# Patient Record
Sex: FEMALE
[De-identification: names, ages, dates, MRNs, and addresses within clinical notes are randomized per-mention and may not be internally consistent; named-entity substitution may affect disease eponyms.]

---

## 2020-07-21 PROBLEM — Z00.00 ENCOUNTER FOR PREVENTIVE HEALTH EXAMINATION: Status: ACTIVE | Noted: 2020-07-21

## 2020-07-24 ENCOUNTER — APPOINTMENT (OUTPATIENT)
Dept: CARDIOLOGY | Facility: CLINIC | Age: 65
End: 2020-07-24
Payer: MEDICAID

## 2020-07-24 VITALS
TEMPERATURE: 97.8 F | WEIGHT: 170 LBS | DIASTOLIC BLOOD PRESSURE: 90 MMHG | BODY MASS INDEX: 27.32 KG/M2 | HEART RATE: 87 BPM | SYSTOLIC BLOOD PRESSURE: 140 MMHG | OXYGEN SATURATION: 100 % | HEIGHT: 66 IN

## 2020-07-24 DIAGNOSIS — Z82.0 FAMILY HISTORY OF EPILEPSY AND OTHER DISEASES OF THE NERVOUS SYSTEM: ICD-10-CM

## 2020-07-24 DIAGNOSIS — B97.7 PAPILLOMAVIRUS AS THE CAUSE OF DISEASES CLASSIFIED ELSEWHERE: ICD-10-CM

## 2020-07-24 DIAGNOSIS — R53.1 WEAKNESS: ICD-10-CM

## 2020-07-24 DIAGNOSIS — M85.80 OTHER SPECIFIED DISORDERS OF BONE DENSITY AND STRUCTURE, UNSPECIFIED SITE: ICD-10-CM

## 2020-07-24 DIAGNOSIS — Z82.49 FAMILY HISTORY OF ISCHEMIC HEART DISEASE AND OTHER DISEASES OF THE CIRCULATORY SYSTEM: ICD-10-CM

## 2020-07-24 PROCEDURE — 99204 OFFICE O/P NEW MOD 45 MIN: CPT

## 2020-07-24 PROCEDURE — 93000 ELECTROCARDIOGRAM COMPLETE: CPT

## 2020-07-24 RX ORDER — LABETALOL HYDROCHLORIDE 200 MG/1
200 TABLET, FILM COATED ORAL EVERY 8 HOURS
Refills: 0 | Status: ACTIVE | COMMUNITY
Start: 2020-07-24

## 2020-07-24 RX ORDER — ATORVASTATIN CALCIUM 40 MG/1
40 TABLET, FILM COATED ORAL
Qty: 30 | Refills: 0 | Status: ACTIVE | COMMUNITY
Start: 2020-07-24

## 2020-07-24 RX ORDER — GABAPENTIN 100 MG/1
100 CAPSULE ORAL 3 TIMES DAILY
Refills: 0 | Status: ACTIVE | COMMUNITY
Start: 2020-07-24

## 2020-07-24 RX ORDER — METFORMIN HYDROCHLORIDE 500 MG/1
500 TABLET, COATED ORAL
Qty: 90 | Refills: 0 | Status: ACTIVE | COMMUNITY
Start: 2020-07-24

## 2020-07-24 NOTE — REASON FOR VISIT
[FreeTextEntry1] : Sherly is a 64-year-old female with complaint of feeling strange and dizzy on occasions, near fainting.  She has past medical history of hypertension, diabetes type 2, history of CVA, hyperlipidemia, osteopenia, unsteady gait walking with a cane.\par \par She states that her episodes of dizziness and feeling strange go back more than a year.  The approximate frequency is once a week.  She is a poor historian and does not provide accurate and consistent answers\par \par She had a loop recorder implanted on 6/25/2019 at [place].  The patient has been to [facility] with near fainting and as per the , her loop recordings were checked corresponding to the symptoms and were found to be unremarkable.\par \par He had an echocardiogram and carotid ultrasound in 2019 which were unremarkable as per Dr. Shine's notes.  Her EKG has sinus rhythm and is unremarkable on 7/21/2020\par \par She denies chest pain or shortness of breath.  She denies palpitations, PND, orthopnea.

## 2020-07-24 NOTE — ASSESSMENT
[FreeTextEntry1] : For further evaluation of her episodes of dizziness and feeling strange, I will interrogate her implanted loop recordings.  It seems that this has been followed by cardiologist at [place] in the past\par \par If no significant arrhythmias are noted, I doubt if there is cardiac etiology to her strange presentation.  It seems that her LV function and echocardiogram were unremarkable in 2019.  Her EKG is unremarkable.  She does not have hypotension or postural blood pressure drop.\par \par Hypoglycemic episodes have been ruled out in the past as per the .\par \par Addition of 5 mg of Norvasc for better blood pressure control is suggested.\par \par Evaluation of CAD with noninvasive testing is unlikely to provide etiology for her dizziness.\par \par She follows lipid profile with Dr. Shine.  Target LDL was discussed.  Patient is already on atorvastatin.\par \par Thank you for this referral and allowing me to participate in the care of this patient.  If can be of any further help or  if you have any questions, please do not hesitate to contact me\par \par \par Sincerely,\par \par Mayank Bernabe MD, FACC, SALMA

## 2020-07-24 NOTE — REVIEW OF SYSTEMS
[Feeling Fatigued] : feeling fatigued [see HPI] : see HPI [Negative] : Heme/Lymph [FreeTextEntry1] : Imbalance

## 2020-07-24 NOTE — PHYSICAL EXAM
[Normal Appearance] : normal appearance [General Appearance - Well Developed] : well developed [General Appearance - Well Nourished] : well nourished [Well Groomed] : well groomed [No Deformities] : no deformities [Eyelids - No Xanthelasma] : the eyelids demonstrated no xanthelasmas [General Appearance - In No Acute Distress] : no acute distress [Normal Conjunctiva] : the conjunctiva exhibited no abnormalities [No Oral Cyanosis] : no oral cyanosis [Normal Oral Mucosa] : normal oral mucosa [No Oral Pallor] : no oral pallor [Heart Sounds] : normal S1 and S2 [Heart Rate And Rhythm] : heart rate and rhythm were normal [Edema] : no peripheral edema present [Murmurs] : no murmurs present [Respiration, Rhythm And Depth] : normal respiratory rhythm and effort [Auscultation Breath Sounds / Voice Sounds] : lungs were clear to auscultation bilaterally [Exaggerated Use Of Accessory Muscles For Inspiration] : no accessory muscle use [Abdomen Soft] : soft [FreeTextEntry1] : Walks slowly with cane [Abdomen Tenderness] : non-tender [Nail Clubbing] : no clubbing of the fingernails [Skin Color & Pigmentation] : normal skin color and pigmentation [Cyanosis, Localized] : no localized cyanosis [] : no rash [Skin Turgor] : normal skin turgor [Affect] : the affect was normal [Memory Recent] : recent memory was not impaired [No Anxiety] : not feeling anxious

## 2020-07-28 ENCOUNTER — APPOINTMENT (OUTPATIENT)
Dept: CARDIOLOGY | Facility: CLINIC | Age: 65
End: 2020-07-28
Payer: MEDICAID

## 2020-07-28 VITALS
TEMPERATURE: 97.1 F | OXYGEN SATURATION: 98 % | BODY MASS INDEX: 27.32 KG/M2 | HEART RATE: 81 BPM | DIASTOLIC BLOOD PRESSURE: 90 MMHG | HEIGHT: 66 IN | WEIGHT: 170 LBS | SYSTOLIC BLOOD PRESSURE: 130 MMHG

## 2020-07-28 PROCEDURE — 93291 INTERROG DEV EVAL SCRMS IP: CPT

## 2020-07-28 NOTE — PROCEDURE
[de-identified] : [employer]  [de-identified] : REVEAL LINQ [de-identified] : [id number] [de-identified] : ILR interrogated in office today\par No new recorded events\par Followed remotely with [facility] - Dr. Barreto\par Battery good\par \par Sincerely,\par \par Tari Frost PA-C\par Patients history, testing and plan reviewed with supervising MD: Dr. Ashley Encinas

## 2020-09-04 ENCOUNTER — APPOINTMENT (OUTPATIENT)
Dept: CARDIOLOGY | Facility: CLINIC | Age: 65
End: 2020-09-04
Payer: MEDICAID

## 2020-09-04 PROCEDURE — 93298 REM INTERROG DEV EVAL SCRMS: CPT

## 2020-09-04 PROCEDURE — G2066: CPT

## 2020-09-04 NOTE — ASSESSMENT
[FreeTextEntry1] : Remote LNQ Summary\par \par 9-4-20\par Viewed lnq summary 7-29-20 to 9-4-20 (we took over remote monitoring from [facility] on 7-29-20)\par No events in this timeframe were reported.  \par While the patient was being monitored at [facility], two symptom episodes were reported in Nov 2019. The symptoms correlated to NSR with APC/PVC/abberant beat. \par Next summary on pa schedule 32d-cv

## 2020-10-07 ENCOUNTER — APPOINTMENT (OUTPATIENT)
Dept: CARDIOLOGY | Facility: CLINIC | Age: 65
End: 2020-10-07
Payer: MEDICAID

## 2020-10-07 PROCEDURE — 93298 REM INTERROG DEV EVAL SCRMS: CPT

## 2020-10-07 PROCEDURE — G2066: CPT

## 2020-10-07 NOTE — ASSESSMENT
[FreeTextEntry1] : Remote LNq Summary\par \par 10-7-20 \par Viewed lnq summary 9-3-20 to 10-7-20. \par No events were noted. \par Next summary on pa schedule 32d-cv

## 2020-10-29 ENCOUNTER — APPOINTMENT (OUTPATIENT)
Dept: CARDIOLOGY | Facility: CLINIC | Age: 65
End: 2020-10-29

## 2020-11-13 ENCOUNTER — APPOINTMENT (OUTPATIENT)
Dept: CARDIOLOGY | Facility: CLINIC | Age: 65
End: 2020-11-13
Payer: MEDICAID

## 2020-11-13 PROCEDURE — 93298 REM INTERROG DEV EVAL SCRMS: CPT

## 2020-11-13 PROCEDURE — G2066: CPT

## 2020-11-13 NOTE — ASSESSMENT
[FreeTextEntry1] : Remote LNq Summary\par \par 11-13-20 \par Viewed lnq summary 10-7-20 to 11-13-20. \par No events were noted.\par  Next summary on pa schedule 32d-cv

## 2020-12-15 ENCOUNTER — APPOINTMENT (OUTPATIENT)
Dept: CARDIOLOGY | Facility: CLINIC | Age: 65
End: 2020-12-15
Payer: MEDICAID

## 2020-12-15 PROCEDURE — G2066: CPT

## 2020-12-15 PROCEDURE — 93298 REM INTERROG DEV EVAL SCRMS: CPT

## 2020-12-15 NOTE — ASSESSMENT
[FreeTextEntry1] : Remote LNq Summary\par \par 12-15-20\par Viewed lnq summary 11-13-20 to 12-15-20.\par  No events were noted. \par Next summary on pa schedule 32d-cv

## 2021-01-26 ENCOUNTER — APPOINTMENT (OUTPATIENT)
Dept: CARDIOLOGY | Facility: CLINIC | Age: 66
End: 2021-01-26
Payer: MEDICAID

## 2021-01-26 PROCEDURE — 93298 REM INTERROG DEV EVAL SCRMS: CPT

## 2021-01-26 PROCEDURE — G2066: CPT

## 2021-01-26 NOTE — PROCEDURE
[de-identified] : \par 1-26-21 lnq summary from 12-16-20 to 1-22-21 No new events - next summary in 32 days - HARSHA

## 2021-02-26 ENCOUNTER — APPOINTMENT (OUTPATIENT)
Dept: CARDIOLOGY | Facility: CLINIC | Age: 66
End: 2021-02-26
Payer: MEDICAID

## 2021-02-26 ENCOUNTER — NON-APPOINTMENT (OUTPATIENT)
Age: 66
End: 2021-02-26

## 2021-02-26 PROCEDURE — G2066: CPT

## 2021-02-26 PROCEDURE — 93298 REM INTERROG DEV EVAL SCRMS: CPT

## 2021-04-01 ENCOUNTER — NON-APPOINTMENT (OUTPATIENT)
Age: 66
End: 2021-04-01

## 2021-04-01 ENCOUNTER — APPOINTMENT (OUTPATIENT)
Dept: CARDIOLOGY | Facility: CLINIC | Age: 66
End: 2021-04-01
Payer: MEDICAID

## 2021-04-01 PROCEDURE — 93298 REM INTERROG DEV EVAL SCRMS: CPT

## 2021-04-01 PROCEDURE — G2066: CPT

## 2021-05-06 ENCOUNTER — APPOINTMENT (OUTPATIENT)
Dept: CARDIOLOGY | Facility: CLINIC | Age: 66
End: 2021-05-06
Payer: MEDICAID

## 2021-05-06 ENCOUNTER — NON-APPOINTMENT (OUTPATIENT)
Age: 66
End: 2021-05-06

## 2021-05-06 PROCEDURE — G2066: CPT

## 2021-05-06 PROCEDURE — 93298 REM INTERROG DEV EVAL SCRMS: CPT

## 2021-06-10 ENCOUNTER — NON-APPOINTMENT (OUTPATIENT)
Age: 66
End: 2021-06-10

## 2021-06-10 ENCOUNTER — APPOINTMENT (OUTPATIENT)
Dept: CARDIOLOGY | Facility: CLINIC | Age: 66
End: 2021-06-10
Payer: MEDICAID

## 2021-06-10 PROCEDURE — 93298 REM INTERROG DEV EVAL SCRMS: CPT

## 2021-06-10 PROCEDURE — G2066: CPT

## 2021-07-15 ENCOUNTER — NON-APPOINTMENT (OUTPATIENT)
Age: 66
End: 2021-07-15

## 2021-07-15 ENCOUNTER — APPOINTMENT (OUTPATIENT)
Dept: CARDIOLOGY | Facility: CLINIC | Age: 66
End: 2021-07-15
Payer: MEDICAID

## 2021-07-15 PROCEDURE — 93298 REM INTERROG DEV EVAL SCRMS: CPT

## 2021-07-15 PROCEDURE — G2066: CPT

## 2021-08-19 ENCOUNTER — APPOINTMENT (OUTPATIENT)
Dept: CARDIOLOGY | Facility: CLINIC | Age: 66
End: 2021-08-19
Payer: MEDICAID

## 2021-08-19 ENCOUNTER — NON-APPOINTMENT (OUTPATIENT)
Age: 66
End: 2021-08-19

## 2021-08-19 PROCEDURE — G2066: CPT

## 2021-08-19 PROCEDURE — 93298 REM INTERROG DEV EVAL SCRMS: CPT

## 2021-09-23 ENCOUNTER — APPOINTMENT (OUTPATIENT)
Dept: CARDIOLOGY | Facility: CLINIC | Age: 66
End: 2021-09-23
Payer: MEDICAID

## 2021-09-23 ENCOUNTER — NON-APPOINTMENT (OUTPATIENT)
Age: 66
End: 2021-09-23

## 2021-09-23 PROCEDURE — 93298 REM INTERROG DEV EVAL SCRMS: CPT

## 2021-09-23 PROCEDURE — G2066: CPT

## 2021-10-28 ENCOUNTER — NON-APPOINTMENT (OUTPATIENT)
Age: 66
End: 2021-10-28

## 2021-10-28 ENCOUNTER — APPOINTMENT (OUTPATIENT)
Dept: CARDIOLOGY | Facility: CLINIC | Age: 66
End: 2021-10-28
Payer: MEDICAID

## 2021-10-28 PROCEDURE — G2066: CPT

## 2021-10-28 PROCEDURE — 93298 REM INTERROG DEV EVAL SCRMS: CPT

## 2021-12-14 ENCOUNTER — NON-APPOINTMENT (OUTPATIENT)
Age: 66
End: 2021-12-14

## 2021-12-15 ENCOUNTER — APPOINTMENT (OUTPATIENT)
Dept: CARDIOLOGY | Facility: CLINIC | Age: 66
End: 2021-12-15
Payer: MEDICAID

## 2021-12-15 ENCOUNTER — APPOINTMENT (OUTPATIENT)
Dept: CARDIOLOGY | Facility: CLINIC | Age: 66
End: 2021-12-15

## 2021-12-15 PROCEDURE — 93298 REM INTERROG DEV EVAL SCRMS: CPT

## 2021-12-15 PROCEDURE — G2066: CPT

## 2022-02-09 ENCOUNTER — APPOINTMENT (OUTPATIENT)
Dept: CARDIOLOGY | Facility: CLINIC | Age: 67
End: 2022-02-09

## 2022-03-16 ENCOUNTER — NON-APPOINTMENT (OUTPATIENT)
Age: 67
End: 2022-03-16

## 2022-03-16 ENCOUNTER — APPOINTMENT (OUTPATIENT)
Dept: CARDIOLOGY | Facility: CLINIC | Age: 67
End: 2022-03-16

## 2022-03-16 ENCOUNTER — APPOINTMENT (OUTPATIENT)
Dept: CARDIOLOGY | Facility: CLINIC | Age: 67
End: 2022-03-16
Payer: MEDICAID

## 2022-03-16 PROCEDURE — 93298 REM INTERROG DEV EVAL SCRMS: CPT

## 2022-03-16 PROCEDURE — G2066: CPT

## 2022-04-20 ENCOUNTER — APPOINTMENT (OUTPATIENT)
Dept: CARDIOLOGY | Facility: CLINIC | Age: 67
End: 2022-04-20

## 2023-01-12 ENCOUNTER — OFFICE (OUTPATIENT)
Dept: URBAN - METROPOLITAN AREA CLINIC 8 | Facility: CLINIC | Age: 68
Setting detail: OPHTHALMOLOGY
End: 2023-01-12
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
## 2023-01-12 PROCEDURE — NO SHOW FE NO SHOW FEE: Performed by: OPHTHALMOLOGY

## 2023-04-27 ENCOUNTER — OFFICE (OUTPATIENT)
Dept: URBAN - METROPOLITAN AREA CLINIC 8 | Facility: CLINIC | Age: 68
Setting detail: OPHTHALMOLOGY
End: 2023-04-27
Payer: MEDICAID

## 2023-04-27 DIAGNOSIS — H16.223: ICD-10-CM

## 2023-04-27 DIAGNOSIS — H52.4: ICD-10-CM

## 2023-04-27 DIAGNOSIS — H01.002: ICD-10-CM

## 2023-04-27 DIAGNOSIS — H25.13: ICD-10-CM

## 2023-04-27 DIAGNOSIS — H01.005: ICD-10-CM

## 2023-04-27 DIAGNOSIS — H40.013: ICD-10-CM

## 2023-04-27 DIAGNOSIS — H35.371: ICD-10-CM

## 2023-04-27 DIAGNOSIS — H01.004: ICD-10-CM

## 2023-04-27 DIAGNOSIS — E11.9: ICD-10-CM

## 2023-04-27 DIAGNOSIS — H10.45: ICD-10-CM

## 2023-04-27 DIAGNOSIS — H01.001: ICD-10-CM

## 2023-04-27 PROCEDURE — 92015 DETERMINE REFRACTIVE STATE: CPT | Performed by: OPHTHALMOLOGY

## 2023-04-27 PROCEDURE — 92250 FUNDUS PHOTOGRAPHY W/I&R: CPT | Performed by: OPHTHALMOLOGY

## 2023-04-27 PROCEDURE — 92014 COMPRE OPH EXAM EST PT 1/>: CPT | Performed by: OPHTHALMOLOGY

## 2023-04-27 ASSESSMENT — AXIALLENGTH_DERIVED
OS_AL: 22.9595
OD_AL: 22.9161

## 2023-04-27 ASSESSMENT — REFRACTION_CURRENTRX
OS_CYLINDER: -0.25
OS_SPHERE: +1.50
OS_ADD: +2.50
OD_VPRISM_DIRECTION: BF
OD_ADD: +2.75
OD_SPHERE: +2.25
OD_OVR_VA: 20/
OS_AXIS: 026
OS_VPRISM_DIRECTION: BF
OS_OVR_VA: 20/
OD_CYLINDER: SPH

## 2023-04-27 ASSESSMENT — REFRACTION_MANIFEST
OS_CYLINDER: SPH
OD_CYLINDER: SPHERE
OD_ADD: +2.75
OS_SPHERE: +4.00
OS_VA1: 20/25-2
OD_SPHERE: +2.50
OS_VA1: 20/25-2
OS_SPHERE: +1.75
OD_VA1: 20/25
OS_VA2: 20/20(J1+)
OD_SPHERE: +4.75
OS_ADD: +2.75
OD_CYLINDER: SPH
OU_VA: 20/25-
OS_CYLINDER: SPHERE
OD_VA2: 20/20(J1+)
OD_ADD: +2.50
OS_ADD: +2.50
OU_VA: 20/25-
OD_SPHERE: +2.50
OS_CYLINDER: SPH
OS_VA2: 20/20(J1+)
OD_VA1: 20/25-
OS_SPHERE: +1.75
OD_VA1: 20/25-
OD_CYLINDER: SPH
OD_VA2: 20/20(J1+)
OS_VA1: 20/20

## 2023-04-27 ASSESSMENT — KERATOMETRY
OS_K2POWER_DIOPTERS: 43.00
OS_AXISANGLE_DEGREES: 095
OS_K1POWER_DIOPTERS: 42.50
OD_K1POWER_DIOPTERS: 42.25
OD_K2POWER_DIOPTERS: 43.50
OD_AXISANGLE_DEGREES: 072
METHOD_AUTO_MANUAL: AUTO

## 2023-04-27 ASSESSMENT — REFRACTION_AUTOREFRACTION
OD_SPHERE: +2.75
OD_CYLINDER: -0.75
OS_SPHERE: +2.50
OD_AXIS: 148
OS_CYLINDER: -0.25
OS_AXIS: 093

## 2023-04-27 ASSESSMENT — VISUAL ACUITY
OS_BCVA: 20/30-
OD_BCVA: 20/30-2

## 2023-04-27 ASSESSMENT — LID EXAM ASSESSMENTS
OS_BLEPHARITIS: LLL LUL
OD_BLEPHARITIS: RLL RUL

## 2023-04-27 ASSESSMENT — CONFRONTATIONAL VISUAL FIELD TEST (CVF)
OS_FINDINGS: FULL
OD_FINDINGS: FULL

## 2023-04-27 ASSESSMENT — TONOMETRY
OD_IOP_MMHG: 20
OS_IOP_MMHG: 18

## 2023-04-27 ASSESSMENT — SPHEQUIV_DERIVED
OS_SPHEQUIV: 2.375
OD_SPHEQUIV: 2.375

## 2024-01-25 ENCOUNTER — OFFICE (OUTPATIENT)
Dept: URBAN - METROPOLITAN AREA CLINIC 8 | Facility: CLINIC | Age: 69
Setting detail: OPHTHALMOLOGY
End: 2024-01-25
Payer: MEDICARE

## 2024-01-25 DIAGNOSIS — H40.013: ICD-10-CM

## 2024-01-25 DIAGNOSIS — H35.371: ICD-10-CM

## 2024-01-25 PROCEDURE — 99213 OFFICE O/P EST LOW 20 MIN: CPT | Performed by: OPHTHALMOLOGY

## 2024-01-25 PROCEDURE — 92133 CPTRZD OPH DX IMG PST SGM ON: CPT | Performed by: OPHTHALMOLOGY

## 2024-01-25 PROCEDURE — 76514 ECHO EXAM OF EYE THICKNESS: CPT | Performed by: OPHTHALMOLOGY

## 2024-01-25 ASSESSMENT — REFRACTION_MANIFEST
OU_VA: 20/25-
OS_VA1: 20/25-2
OU_VA: 20/25-
OD_CYLINDER: SPH
OD_VA1: 20/25-
OS_SPHERE: +4.00
OD_VA2: 20/20(J1+)
OD_VA2: 20/20(J1+)
OD_SPHERE: +2.50
OS_CYLINDER: SPH
OD_VA1: 20/25
OD_SPHERE: +2.50
OD_ADD: +2.50
OD_SPHERE: +4.75
OS_CYLINDER: SPHERE
OD_ADD: +2.75
OD_VA1: 20/25-
OS_VA1: 20/20
OS_VA1: 20/25-2
OS_CYLINDER: SPH
OS_ADD: +2.75
OS_SPHERE: +1.75
OS_SPHERE: +1.75
OS_VA2: 20/20(J1+)
OD_CYLINDER: SPH
OS_VA2: 20/20(J1+)
OD_CYLINDER: SPHERE
OS_ADD: +2.50

## 2024-01-25 ASSESSMENT — REFRACTION_CURRENTRX
OS_OVR_VA: 20/
OD_OVR_VA: 20/
OD_VPRISM_DIRECTION: BF
OS_CYLINDER: -0.25
OD_SPHERE: +2.25
OS_SPHERE: +1.50
OD_CYLINDER: SPH
OS_AXIS: 026
OD_ADD: +2.75
OS_ADD: +2.50
OS_VPRISM_DIRECTION: BF

## 2024-01-25 ASSESSMENT — CONFRONTATIONAL VISUAL FIELD TEST (CVF)
OD_FINDINGS: FULL
OS_FINDINGS: FULL

## 2024-01-25 ASSESSMENT — LID EXAM ASSESSMENTS
OS_BLEPHARITIS: LLL LUL
OD_BLEPHARITIS: RLL RUL

## 2024-01-25 ASSESSMENT — REFRACTION_AUTOREFRACTION
OD_CYLINDER: -0.75
OS_AXIS: 093
OS_CYLINDER: -0.25
OD_AXIS: 148
OS_SPHERE: +2.50
OD_SPHERE: +2.75

## 2024-01-25 ASSESSMENT — SPHEQUIV_DERIVED
OS_SPHEQUIV: 2.375
OD_SPHEQUIV: 2.375

## 2024-05-03 ENCOUNTER — OFFICE (OUTPATIENT)
Dept: URBAN - METROPOLITAN AREA CLINIC 8 | Facility: CLINIC | Age: 69
Setting detail: OPHTHALMOLOGY
End: 2024-05-03
Payer: MEDICARE

## 2024-05-03 DIAGNOSIS — H25.13: ICD-10-CM

## 2024-05-03 DIAGNOSIS — H01.004: ICD-10-CM

## 2024-05-03 DIAGNOSIS — H16.223: ICD-10-CM

## 2024-05-03 DIAGNOSIS — H40.013: ICD-10-CM

## 2024-05-03 DIAGNOSIS — H01.001: ICD-10-CM

## 2024-05-03 DIAGNOSIS — I63.50: ICD-10-CM

## 2024-05-03 PROCEDURE — 99213 OFFICE O/P EST LOW 20 MIN: CPT | Performed by: OPHTHALMOLOGY

## 2024-05-03 ASSESSMENT — LID EXAM ASSESSMENTS
OS_BLEPHARITIS: LLL LUL
OD_BLEPHARITIS: RLL RUL

## 2024-05-03 ASSESSMENT — CONFRONTATIONAL VISUAL FIELD TEST (CVF)
OD_FINDINGS: FULL
OS_FINDINGS: FULL

## 2024-06-19 PROBLEM — E11.9 TYPE 2 DIABETES MELLITUS: Status: ACTIVE | Noted: 2020-07-24

## 2024-06-19 PROBLEM — I10 HTN (HYPERTENSION): Status: ACTIVE | Noted: 2020-07-24

## 2024-06-19 PROBLEM — Z98.890 HISTORY OF LOOP RECORDER: Status: ACTIVE | Noted: 2020-09-04

## 2024-06-19 PROBLEM — E78.5 HLD (HYPERLIPIDEMIA): Status: ACTIVE | Noted: 2020-07-24

## 2024-06-19 PROBLEM — I63.9 CVA (CEREBRAL VASCULAR ACCIDENT): Status: ACTIVE | Noted: 2020-07-24

## 2024-06-19 PROBLEM — R42 DIZZINESS: Status: ACTIVE | Noted: 2020-07-24

## 2024-06-20 ENCOUNTER — NON-APPOINTMENT (OUTPATIENT)
Age: 69
End: 2024-06-20

## 2024-06-20 ENCOUNTER — APPOINTMENT (OUTPATIENT)
Dept: CARDIOLOGY | Facility: CLINIC | Age: 69
End: 2024-06-20
Payer: MEDICARE

## 2024-06-20 VITALS — DIASTOLIC BLOOD PRESSURE: 84 MMHG | SYSTOLIC BLOOD PRESSURE: 140 MMHG

## 2024-06-20 VITALS
DIASTOLIC BLOOD PRESSURE: 88 MMHG | SYSTOLIC BLOOD PRESSURE: 160 MMHG | BODY MASS INDEX: 28.28 KG/M2 | OXYGEN SATURATION: 98 % | WEIGHT: 176 LBS | HEIGHT: 66 IN | HEART RATE: 66 BPM

## 2024-06-20 DIAGNOSIS — E78.5 HYPERLIPIDEMIA, UNSPECIFIED: ICD-10-CM

## 2024-06-20 DIAGNOSIS — E11.9 TYPE 2 DIABETES MELLITUS W/OUT COMPLICATIONS: ICD-10-CM

## 2024-06-20 DIAGNOSIS — R42 DIZZINESS AND GIDDINESS: ICD-10-CM

## 2024-06-20 DIAGNOSIS — I63.9 CEREBRAL INFARCTION, UNSPECIFIED: ICD-10-CM

## 2024-06-20 DIAGNOSIS — R80.9 PROTEINURIA, UNSPECIFIED: ICD-10-CM

## 2024-06-20 DIAGNOSIS — I10 ESSENTIAL (PRIMARY) HYPERTENSION: ICD-10-CM

## 2024-06-20 DIAGNOSIS — Z98.890 OTHER SPECIFIED POSTPROCEDURAL STATES: ICD-10-CM

## 2024-06-20 PROCEDURE — 93000 ELECTROCARDIOGRAM COMPLETE: CPT

## 2024-06-20 PROCEDURE — 99203 OFFICE O/P NEW LOW 30 MIN: CPT

## 2024-06-20 RX ORDER — LOSARTAN POTASSIUM 25 MG/1
25 TABLET, FILM COATED ORAL TWICE DAILY
Qty: 180 | Refills: 3 | Status: ACTIVE | COMMUNITY
Start: 2024-06-20 | End: 1900-01-01

## 2024-06-20 NOTE — PROCEDURE
[de-identified] : \par  1-26-21 lnq summary from 12-16-20 to 1-22-21 No new events - next summary in 32 days - HARSHA

## 2024-06-20 NOTE — ASSESSMENT
[FreeTextEntry1] : 68-year-old female with past medical history of hypertension, diabetes type 2, history of CVA, hyperlipidemia, osteopenia, unsteady gait walking with a cane. Implanted loop recordings without significant arrhythmia.making it unlikely to be a cardiac etiology to her strange presentation. Hypoglycemic episodes have been ruled out in the past. BP today is elevated.

## 2024-06-20 NOTE — DISCUSSION/SUMMARY
[EKG obtained to assist in diagnosis and management of assessed problem(s)] : EKG obtained to assist in diagnosis and management of assessed problem(s) [FreeTextEntry1] : 1. We reviewed the importance of a healthy lifestyle including normal body weight, regular physical activity with 30 to 45 minutes of exercise most days of the week, Mediterranean style diet with fresh fruits, vegetables, nuts, beans, seeds, legumes, whole grain products, lean proteins from fish and other seafood, olive oil as a primary fat source and limiting consumption of sweets, highly processed foods, meats and saturated fats.  2. BP is elevated will add ARB, losartan 25 mg twice daily in setting of htn, diabetes and mild proteinuria. Continue labetalol. Reduce salt intake 3. Transthoracic echocardiogram (TTE) for evaluation of cardiac structure and function 4. Carotid Doppler 5. Scheduled cardiology follow up in about one month after above testing or sooner prn. Laboratory at time of f/u to include lipids and BMP.

## 2024-06-20 NOTE — PHYSICAL EXAM
[Well Developed] : well developed [Well Nourished] : well nourished [No Acute Distress] : no acute distress [Normal Conjunctiva] : normal conjunctiva [Normal Venous Pressure] : normal venous pressure [No Carotid Bruit] : no carotid bruit [Normal S1, S2] : normal S1, S2 [No Murmur] : no murmur [Clear Lung Fields] : clear lung fields [Good Air Entry] : good air entry [No Respiratory Distress] : no respiratory distress  [Soft] : abdomen soft [Non Tender] : non-tender [Normal Bowel Sounds] : normal bowel sounds [Abnormal Gait] : abnormal gait [No Edema] : no edema [No Cyanosis] : no cyanosis [No Rash] : no rash [No Skin Lesions] : no skin lesions [Moves all extremities] : moves all extremities [Normal Speech] : normal speech [Alert and Oriented] : alert and oriented [Normal memory] : normal memory [Cognitive Impairment] : cognitive impairment [de-identified] : Unsteady

## 2024-06-20 NOTE — HISTORY OF PRESENT ILLNESS
[FreeTextEntry1] : 68-year-old female with past medical history of hypertension, diabetes type 2, history of 2019 CVA, hyperlipidemia, osteopenia, unsteady gait walking with a cane.  She had ILR 6/25/2019 at [place] for episodes of near fainting, feeling strange and dizziness. She is a poor historian and does not provide accurate and consistent answers.  She walks with a cane.  She walks 5 to 10 minutes and also helps out at home doing the laundry.  These activities are well-tolerated without chest discomfort or shortness of breath.  She has gained some weight.  She watches her diet but is not strict and uses butter and salt.   She denies any difficulty sleeping at night, palpitations or lightheadedness, syncope or presyncope.  At times she has mild ankle swelling.  Echocardiogram and carotid ultrasound in 2019 were unremarkable    Laboratory 3/20/2024 hemoglobin 13.1, hematocrit 42%, platelet 221, UA protein trace, sodium 139, potassium 3.9, creatinine 0.88, hemoglobin A1c 7.3, calcium 10, LFTs normal, albumin mildly elevated 4.7

## 2024-06-20 NOTE — HISTORY OF PRESENT ILLNESS
[FreeTextEntry1] : 68-year-old female with past medical history of hypertension, diabetes type 2, history of 2019 CVA, hyperlipidemia, osteopenia, unsteady gait walking with a cane.  She had ILR 6/25/2019 at [place] for episodes of near fainting, feeling strange and dizziness. She is a poor historian and does not provide accurate and consistent answers.  She walks with a cane.  She walks 5 to 10 minutes and also helps out at home doing the laundry.  These activities are well-tolerated without chest discomfort or shortness of breath.  She has gained some weight.  She watches her diet but is not strict and uses butter and salt.   She denies any difficulty sleeping at night, palpitations or lightheadedness, syncope or presyncope.  At times she has mild ankle swelling.  Echocardiogram and carotid ultrasound in 2019 were unremarkable    Laboratory 3/20/2024 hemoglobin 13.1, hematocrit 42%, platelet 221, UA protein trace, sodium 139, potassium 3.9, creatinine 0.88, hemoglobin A1c 7.3, calcium 10, LFTs normal, albumin mildly elevated 4.7

## 2024-06-20 NOTE — PROCEDURE
[de-identified] : \par  1-26-21 lnq summary from 12-16-20 to 1-22-21 No new events - next summary in 32 days - HARSHA

## 2024-06-20 NOTE — PHYSICAL EXAM
[Well Developed] : well developed [Well Nourished] : well nourished [No Acute Distress] : no acute distress [Normal Conjunctiva] : normal conjunctiva [Normal Venous Pressure] : normal venous pressure [No Carotid Bruit] : no carotid bruit [Normal S1, S2] : normal S1, S2 [No Murmur] : no murmur [Clear Lung Fields] : clear lung fields [Good Air Entry] : good air entry [No Respiratory Distress] : no respiratory distress  [Soft] : abdomen soft [Non Tender] : non-tender [Normal Bowel Sounds] : normal bowel sounds [Abnormal Gait] : abnormal gait [No Edema] : no edema [No Cyanosis] : no cyanosis [No Rash] : no rash [No Skin Lesions] : no skin lesions [Moves all extremities] : moves all extremities [Normal Speech] : normal speech [Alert and Oriented] : alert and oriented [Normal memory] : normal memory [Cognitive Impairment] : cognitive impairment [de-identified] : Unsteady

## 2024-08-27 ENCOUNTER — APPOINTMENT (OUTPATIENT)
Dept: CARDIOLOGY | Facility: CLINIC | Age: 69
End: 2024-08-27
Payer: MEDICARE

## 2024-08-27 ENCOUNTER — NON-APPOINTMENT (OUTPATIENT)
Age: 69
End: 2024-08-27

## 2024-08-27 VITALS
HEART RATE: 72 BPM | WEIGHT: 188 LBS | BODY MASS INDEX: 30.34 KG/M2 | SYSTOLIC BLOOD PRESSURE: 126 MMHG | DIASTOLIC BLOOD PRESSURE: 80 MMHG | OXYGEN SATURATION: 96 %

## 2024-08-27 DIAGNOSIS — I63.9 CEREBRAL INFARCTION, UNSPECIFIED: ICD-10-CM

## 2024-08-27 DIAGNOSIS — E78.5 HYPERLIPIDEMIA, UNSPECIFIED: ICD-10-CM

## 2024-08-27 DIAGNOSIS — E11.9 TYPE 2 DIABETES MELLITUS W/OUT COMPLICATIONS: ICD-10-CM

## 2024-08-27 DIAGNOSIS — I10 ESSENTIAL (PRIMARY) HYPERTENSION: ICD-10-CM

## 2024-08-27 DIAGNOSIS — I67.2 CEREBRAL ATHEROSCLEROSIS: ICD-10-CM

## 2024-08-27 DIAGNOSIS — R80.9 PROTEINURIA, UNSPECIFIED: ICD-10-CM

## 2024-08-27 PROCEDURE — 99213 OFFICE O/P EST LOW 20 MIN: CPT

## 2024-08-27 PROCEDURE — 93880 EXTRACRANIAL BILAT STUDY: CPT

## 2024-08-27 PROCEDURE — 93306 TTE W/DOPPLER COMPLETE: CPT

## 2024-08-27 NOTE — REASON FOR VISIT
[FreeTextEntry1] : BP is elevated will add ARB, losartan 25 mg twice daily in setting of htn, diabetes and mild proteinuria. Continue labetalol. Reduce salt intake Transthoracic echocardiogram 8/27/24 Carotid Doppler 8/27/24 [Symptom and Test Evaluation] : symptom and test evaluation [Family Member] : family member

## 2024-08-29 NOTE — PHYSICAL EXAM
[Well Developed] : well developed [Well Nourished] : well nourished [No Acute Distress] : no acute distress [Normal Conjunctiva] : normal conjunctiva [Normal Venous Pressure] : normal venous pressure [No Carotid Bruit] : no carotid bruit [Normal S1, S2] : normal S1, S2 [No Murmur] : no murmur [Clear Lung Fields] : clear lung fields [Good Air Entry] : good air entry [No Respiratory Distress] : no respiratory distress  [Soft] : abdomen soft [Non Tender] : non-tender [Normal Bowel Sounds] : normal bowel sounds [Abnormal Gait] : abnormal gait [No Edema] : no edema [No Cyanosis] : no cyanosis [No Rash] : no rash [No Skin Lesions] : no skin lesions [Moves all extremities] : moves all extremities [Normal Speech] : normal speech [Alert and Oriented] : alert and oriented [Normal memory] : normal memory [Cognitive Impairment] : cognitive impairment [de-identified] : Unsteady

## 2024-08-29 NOTE — PHYSICAL EXAM
[Well Developed] : well developed [Well Nourished] : well nourished [No Acute Distress] : no acute distress [Normal Conjunctiva] : normal conjunctiva [Normal Venous Pressure] : normal venous pressure [No Carotid Bruit] : no carotid bruit [Normal S1, S2] : normal S1, S2 [No Murmur] : no murmur [Clear Lung Fields] : clear lung fields [Good Air Entry] : good air entry [No Respiratory Distress] : no respiratory distress  [Soft] : abdomen soft [Non Tender] : non-tender [Normal Bowel Sounds] : normal bowel sounds [Abnormal Gait] : abnormal gait [No Edema] : no edema [No Cyanosis] : no cyanosis [No Rash] : no rash [No Skin Lesions] : no skin lesions [Moves all extremities] : moves all extremities [Normal Speech] : normal speech [Alert and Oriented] : alert and oriented [Normal memory] : normal memory [Cognitive Impairment] : cognitive impairment [de-identified] : Unsteady

## 2024-08-29 NOTE — ASSESSMENT
[FreeTextEntry1] : 68-year-old female with past medical history of hypertension, diabetes type 2, history of CVA, hyperlipidemia, osteopenia, unsteady gait walking with a cane. Implanted loop recordings without significant arrhythmia with unlikely to be a cardiac etiology to her strange presentation. Hypoglycemic episodes have been ruled out in the past. BP today is elevated.

## 2024-08-29 NOTE — CARDIOLOGY SUMMARY
[de-identified] : Transthoracic echocardiogram 8/27/24 shows normal LV systolic function with an LVEF estimated 60 to 65%, normal LV diastolic function, mild AR, trace to mild TR, trace MR, estimated PASP is 24 mmHg.  [de-identified] : 6/20/2024 Sinus Rhythm, rate 65 bpm, Nonspecific t wave changes Borderline ECG [de-identified] : 8/27/2024  1. 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   2. Right vertebral shows diminished velocity.   3. Vertebral arteries: antegrade flow bilaterally. 4. No prior exam is available for comparison.

## 2024-08-29 NOTE — PROCEDURE
[de-identified] : \par  1-26-21 lnq summary from 12-16-20 to 1-22-21 No new events - next summary in 32 days - HARSHA

## 2024-08-29 NOTE — PHYSICAL EXAM
[Well Developed] : well developed [Well Nourished] : well nourished [No Acute Distress] : no acute distress [Normal Conjunctiva] : normal conjunctiva [Normal Venous Pressure] : normal venous pressure [No Carotid Bruit] : no carotid bruit [Normal S1, S2] : normal S1, S2 [No Murmur] : no murmur [Clear Lung Fields] : clear lung fields [Good Air Entry] : good air entry [No Respiratory Distress] : no respiratory distress  [Soft] : abdomen soft [Non Tender] : non-tender [Normal Bowel Sounds] : normal bowel sounds [Abnormal Gait] : abnormal gait [No Edema] : no edema [No Cyanosis] : no cyanosis [No Rash] : no rash [No Skin Lesions] : no skin lesions [Moves all extremities] : moves all extremities [Normal Speech] : normal speech [Alert and Oriented] : alert and oriented [Normal memory] : normal memory [Cognitive Impairment] : cognitive impairment [de-identified] : Unsteady

## 2024-08-29 NOTE — HISTORY OF PRESENT ILLNESS
[FreeTextEntry1] : 68-year-old female with past medical history of hypertension, diabetes type 2, history of 2019 CVA, hyperlipidemia, osteopenia, unsteady gait walking with a cane.  She had ILR 6/25/2019 at [place] for episodes of near fainting, feeling strange and dizziness. She is a poor historian and does not provide accurate and consistent answers.  She walks with a cane.  She walks 5 to 10 minutes and also helps out at home doing the laundry.  These activities are well-tolerated without chest discomfort or shortness of breath.  She has gained some weight.  She watches her diet but is not strict and uses butter and salt.   She denies any difficulty sleeping at night, palpitations or lightheadedness, syncope or presyncope.  At times she has mild ankle swelling. Did not begin losartan.  Transthoracic echocardiogram 8/27/24 shows normal LV systolic function with an LVEF estimated 60 to 65%, normal LV diastolic function, mild AR, trace to mild TR, trace MR, estimated PASP is 24 mmHg. Carotid Doppler 8/27/24 diminished right vertebral velocity    Laboratory 3/20/2024 hemoglobin 13.1, hematocrit 42%, platelet 221, UA protein trace, sodium 139, potassium 3.9, creatinine 0.88, hemoglobin A1c 7.3, calcium 10, LFTs normal, albumin mildly elevated 4.7

## 2024-08-29 NOTE — HISTORY OF PRESENT ILLNESS
[FreeTextEntry1] : 68-year-old female with past medical history of hypertension, diabetes type 2, history of 2019 CVA, hyperlipidemia, osteopenia, unsteady gait walking with a cane.  She had ILR 6/25/2019 at [place] for episodes of near fainting, feeling strange and dizziness. She is a poor historian and does not provide accurate and consistent answers.  She walks with a cane.  She walks 5 to 10 minutes and also helps out at home doing the laundry.  These activities are well-tolerated without chest discomfort or shortness of breath.  She has gained some weight.  She watches her diet but is not strict and uses butter and salt.   She denies any difficulty sleeping at night, palpitations or lightheadedness, syncope or presyncope.  At times she has mild ankle swelling. Did not begin losartan.  Transthoracic echocardiogram 8/27/24 shows normal LV systolic function with an LVEF estimated 60 to 65%, normal LV diastolic function, mild AR, trace to mild TR, trace MR, estimated PASP is 24 mmHg. Carotid Doppler 8/27/24 diminished right vertebral velocity    Laboratory 3/20/2024 hemoglobin 13.1, hematocrit 42%, platelet 221, UA protein trace, sodium 139, potassium 3.9, creatinine 0.88, hemoglobin A1c 7.3, calcium 10, LFTs normal, albumin mildly elevated 4.7

## 2024-08-29 NOTE — CARDIOLOGY SUMMARY
[de-identified] : Transthoracic echocardiogram 8/27/24 shows normal LV systolic function with an LVEF estimated 60 to 65%, normal LV diastolic function, mild AR, trace to mild TR, trace MR, estimated PASP is 24 mmHg.  [de-identified] : 6/20/2024 Sinus Rhythm, rate 65 bpm, Nonspecific t wave changes Borderline ECG [de-identified] : 8/27/2024  1. 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   2. Right vertebral shows diminished velocity.   3. Vertebral arteries: antegrade flow bilaterally. 4. No prior exam is available for comparison.

## 2024-08-29 NOTE — HISTORY OF PRESENT ILLNESS
[FreeTextEntry1] : 68-year-old female with past medical history of hypertension, diabetes type 2, history of 2019 CVA, hyperlipidemia, osteopenia, unsteady gait walking with a cane.  She had ILR 6/25/2019 at [place] for episodes of near fainting, feeling strange and dizziness. She is a poor historian and does not provide accurate and consistent answers.  She walks with a cane.  She walks 5 to 10 minutes and also helps out at home doing the laundry.  These activities are well-tolerated without chest discomfort or shortness of breath.  She has gained some weight.  She watches her diet but is not strict and uses butter and salt.   She denies any difficulty sleeping at night, palpitations or lightheadedness, syncope or presyncope.  At times she has mild ankle swelling. Did not begin losartan.  Transthoracic echocardiogram 8/27/24 shows normal LV systolic function with an LVEF estimated 60 to 65%, normal LV diastolic function, mild AR, trace to mild TR, trace MR, estimated PASP is 24 mmHg. Carotid Doppler 8/27/24 diminished right vertebral velocity    Laboratory 3/20/2024 hemoglobin 13.1, hematocrit 42%, platelet 221, UA protein trace, sodium 139, potassium 3.9, creatinine 0.88, hemoglobin A1c 7.3, calcium 10, LFTs normal, albumin mildly elevated 4.7

## 2024-08-29 NOTE — PROCEDURE
[de-identified] : \par  1-26-21 lnq summary from 12-16-20 to 1-22-21 No new events - next summary in 32 days - HARSHA

## 2024-08-29 NOTE — CARDIOLOGY SUMMARY
[de-identified] : 6/20/2024 Sinus Rhythm, rate 65 bpm, Nonspecific t wave changes Borderline ECG [de-identified] : Transthoracic echocardiogram 8/27/24 shows normal LV systolic function with an LVEF estimated 60 to 65%, normal LV diastolic function, mild AR, trace to mild TR, trace MR, estimated PASP is 24 mmHg.  [de-identified] : 8/27/2024  1. 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   2. Right vertebral shows diminished velocity.   3. Vertebral arteries: antegrade flow bilaterally. 4. No prior exam is available for comparison.

## 2024-08-29 NOTE — CARDIOLOGY SUMMARY
[de-identified] : Transthoracic echocardiogram 8/27/24 shows normal LV systolic function with an LVEF estimated 60 to 65%, normal LV diastolic function, mild AR, trace to mild TR, trace MR, estimated PASP is 24 mmHg.  [de-identified] : 6/20/2024 Sinus Rhythm, rate 65 bpm, Nonspecific t wave changes Borderline ECG [de-identified] : 8/27/2024  1. 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   2. Right vertebral shows diminished velocity.   3. Vertebral arteries: antegrade flow bilaterally. 4. No prior exam is available for comparison.

## 2024-08-29 NOTE — DISCUSSION/SUMMARY
[FreeTextEntry1] : 1. We reviewed the importance of a healthy lifestyle including normal body weight, regular physical activity with 30 to 45 minutes of exercise most days of the week, Mediterranean style diet with fresh fruits, vegetables, nuts, beans, seeds, legumes, whole grain products, lean proteins from fish and other seafood, olive oil as a primary fat source and limiting consumption of sweets, highly processed foods, meats and saturated fats.  2. BP is elevated will add ARB, losartan 25 mg twice daily in setting of htn, diabetes and mild proteinuria. Continue labetalol. Reduce salt intake 3. Transthoracic echocardiogram (TTE) for evaluation of cardiac structure and function 4. Carotid Doppler 5. Scheduled cardiology follow up in about one month after above testing or sooner prn. Laboratory at time of f/u to include lipids and BMP.

## 2024-08-29 NOTE — PROCEDURE
[de-identified] : \par  1-26-21 lnq summary from 12-16-20 to 1-22-21 No new events - next summary in 32 days - HARSHA

## 2024-08-29 NOTE — PHYSICAL EXAM
[Well Developed] : well developed [Well Nourished] : well nourished [No Acute Distress] : no acute distress [Normal Conjunctiva] : normal conjunctiva [Normal Venous Pressure] : normal venous pressure [No Carotid Bruit] : no carotid bruit [Normal S1, S2] : normal S1, S2 [No Murmur] : no murmur [Clear Lung Fields] : clear lung fields [Good Air Entry] : good air entry [No Respiratory Distress] : no respiratory distress  [Soft] : abdomen soft [Non Tender] : non-tender [Normal Bowel Sounds] : normal bowel sounds [Abnormal Gait] : abnormal gait [No Edema] : no edema [No Cyanosis] : no cyanosis [No Rash] : no rash [No Skin Lesions] : no skin lesions [Moves all extremities] : moves all extremities [Normal Speech] : normal speech [Alert and Oriented] : alert and oriented [Normal memory] : normal memory [Cognitive Impairment] : cognitive impairment [de-identified] : Unsteady

## 2024-08-29 NOTE — PROCEDURE
[de-identified] : \par  1-26-21 lnq summary from 12-16-20 to 1-22-21 No new events - next summary in 32 days - HARSHA

## 2024-08-29 NOTE — PROCEDURE
[de-identified] : \par  1-26-21 lnq summary from 12-16-20 to 1-22-21 No new events - next summary in 32 days - HARSHA

## 2024-08-29 NOTE — CARDIOLOGY SUMMARY
[de-identified] : Transthoracic echocardiogram 8/27/24 shows normal LV systolic function with an LVEF estimated 60 to 65%, normal LV diastolic function, mild AR, trace to mild TR, trace MR, estimated PASP is 24 mmHg.  [de-identified] : 6/20/2024 Sinus Rhythm, rate 65 bpm, Nonspecific t wave changes Borderline ECG [de-identified] : 8/27/2024  1. 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   2. Right vertebral shows diminished velocity.   3. Vertebral arteries: antegrade flow bilaterally. 4. No prior exam is available for comparison.

## 2024-08-29 NOTE — HISTORY OF PRESENT ILLNESS
[FreeTextEntry1] : 68-year-old female with past medical history of hypertension, diabetes type 2, history of 2019 CVA, hyperlipidemia, osteopenia, unsteady gait walking with a cane.  She had ILR 6/25/2019 at [place] for episodes of near fainting, feeling strange and dizziness. She is a poor historian and does not provide accurate and consistent answers.  She walks with a cane.  She walks 5 to 10 minutes and also helps out at home doing the laundry.  These activities are well-tolerated without chest discomfort or shortness of breath.  She has gained some weight.  She watches her diet but is not strict and uses butter and salt.   She denies any difficulty sleeping at night, palpitations or lightheadedness, syncope or presyncope.  At times she has mild ankle swelling. Did not begin losartan.  Transthoracic echocardiogram 8/27/24 shows normal LV systolic function with an LVEF estimated 60 to 65%, normal LV diastolic function, mild AR, trace to mild TR, trace MR, estimated PASP is 24 mmHg. Carotid Doppler 8/27/24 diminished right vertebral velocity    Laboratory 3/20/2024 hemoglobin 13.1, hematocrit 42%, platelet 221, UA protein trace, sodium 139, potassium 3.9, creatinine 0.88, hemoglobin A1c 7.3, calcium 10, LFTs normal, albumin mildly elevated 4.7

## 2024-08-29 NOTE — HISTORY OF PRESENT ILLNESS
[FreeTextEntry1] : 68-year-old female with past medical history of hypertension, diabetes type 2, history of 2019 CVA, hyperlipidemia, osteopenia, unsteady gait walking with a cane.  She had ILR 6/25/2019 at [place] for episodes of near fainting, feeling strange and dizziness. She is a poor historian and does not provide accurate and consistent answers.  She walks with a cane.  She walks 5 to 10 minutes and also helps out at home doing the laundry.  These activities are well-tolerated without chest discomfort or shortness of breath.  She has gained some weight.  She watches her diet but is not strict and uses butter and salt.   She denies any difficulty sleeping at night, palpitations or lightheadedness, syncope or presyncope.  At times she has mild ankle swelling. Did not begin losartan.  Transthoracic echocardiogram 8/27/24 shows normal LV systolic function with an LVEF estimated 60 to 65%, normal LV diastolic function, mild AR, trace to mild TR, trace MR, estimated PASP is 24 mmHg. Carotid Doppler 8/27/24 diminished right vertebral velocity    Laboratory 3/20/2024 hemoglobin 13.1, hematocrit 42%, platelet 221, UA protein trace, sodium 139, potassium 3.9, creatinine 0.88, hemoglobin A1c 7.3, calcium 10, LFTs normal, albumin mildly elevated 4.7

## 2024-08-29 NOTE — PHYSICAL EXAM
[Well Developed] : well developed [Well Nourished] : well nourished [No Acute Distress] : no acute distress [Normal Conjunctiva] : normal conjunctiva [Normal Venous Pressure] : normal venous pressure [No Carotid Bruit] : no carotid bruit [Normal S1, S2] : normal S1, S2 [No Murmur] : no murmur [Clear Lung Fields] : clear lung fields [Good Air Entry] : good air entry [No Respiratory Distress] : no respiratory distress  [Soft] : abdomen soft [Non Tender] : non-tender [Normal Bowel Sounds] : normal bowel sounds [Abnormal Gait] : abnormal gait [No Edema] : no edema [No Cyanosis] : no cyanosis [No Rash] : no rash [No Skin Lesions] : no skin lesions [Moves all extremities] : moves all extremities [Normal Speech] : normal speech [Alert and Oriented] : alert and oriented [Normal memory] : normal memory [Cognitive Impairment] : cognitive impairment [de-identified] : Unsteady

## 2025-01-23 ENCOUNTER — OFFICE (OUTPATIENT)
Dept: URBAN - METROPOLITAN AREA CLINIC 8 | Facility: CLINIC | Age: 70
Setting detail: OPHTHALMOLOGY
End: 2025-01-23
Payer: MEDICARE

## 2025-01-23 ENCOUNTER — RX ONLY (RX ONLY)
Age: 70
End: 2025-01-23

## 2025-01-23 DIAGNOSIS — E11.9: ICD-10-CM

## 2025-01-23 DIAGNOSIS — I63.50: ICD-10-CM

## 2025-01-23 DIAGNOSIS — H25.13: ICD-10-CM

## 2025-01-23 DIAGNOSIS — H16.223: ICD-10-CM

## 2025-01-23 DIAGNOSIS — H40.013: ICD-10-CM

## 2025-01-23 DIAGNOSIS — H01.001: ICD-10-CM

## 2025-01-23 DIAGNOSIS — H35.371: ICD-10-CM

## 2025-01-23 DIAGNOSIS — H52.4: ICD-10-CM

## 2025-01-23 DIAGNOSIS — H01.004: ICD-10-CM

## 2025-01-23 PROCEDURE — 92014 COMPRE OPH EXAM EST PT 1/>: CPT | Performed by: OPHTHALMOLOGY

## 2025-01-23 PROCEDURE — 92015 DETERMINE REFRACTIVE STATE: CPT | Performed by: OPHTHALMOLOGY

## 2025-01-23 PROCEDURE — 92133 CPTRZD OPH DX IMG PST SGM ON: CPT | Performed by: OPHTHALMOLOGY

## 2025-01-23 ASSESSMENT — TONOMETRY
OD_IOP_MMHG: 16
OS_IOP_MMHG: 17

## 2025-01-23 ASSESSMENT — REFRACTION_MANIFEST
OD_VA1: 20/25-2
OS_SPHERE: +2.50
OD_ADD: +2.50
OS_VA1: 20/25-2
OD_VA1: 20/25
OS_VA2: 20/20(J1+)
OS_ADD: +2.50
OS_CYLINDER: SPH
OS_CYLINDER: SPH
OD_CYLINDER: SPHERE
OS_VA1: 20/20
OD_CYLINDER: SPH
OS_ADD: +2.75
OD_VA2: 20/20(J1+)
OS_VA2: 20/20(J1+)
OD_SPHERE: +2.25
OD_SPHERE: +4.75
OS_VA1: 20/25-2
OD_VA2: 20/20(J1+)
OD_ADD: +2.75
OS_SPHERE: +2.50
OU_VA: 20/25-
OD_CYLINDER: SPH
OS_CYLINDER: SPHERE
OS_SPHERE: +4.00
OD_SPHERE: +2.25
OD_VA1: 20/25-2
OU_VA: 20/25-

## 2025-01-23 ASSESSMENT — REFRACTION_AUTOREFRACTION
OS_CYLINDER: -0.25
OD_CYLINDER: -0.75
OD_AXIS: 138
OD_SPHERE: +3.00
OS_SPHERE: +2.75
OS_AXIS: 061

## 2025-01-23 ASSESSMENT — REFRACTION_CURRENTRX
OD_OVR_VA: 20/
OS_SPHERE: +1.50
OD_SPHERE: +2.25
OD_VPRISM_DIRECTION: BF
OS_OVR_VA: 20/
OS_ADD: +2.50
OS_CYLINDER: -0.25
OD_CYLINDER: SPH
OS_VPRISM_DIRECTION: BF
OD_ADD: +2.75
OS_AXIS: 026

## 2025-01-23 ASSESSMENT — KERATOMETRY
METHOD_AUTO_MANUAL: AUTO
OS_K2POWER_DIOPTERS: 43.25
OD_K1POWER_DIOPTERS: 42.25
OD_AXISANGLE_DEGREES: 069
OS_AXISANGLE_DEGREES: 096
OD_K2POWER_DIOPTERS: 43.50
OS_K1POWER_DIOPTERS: 42.50

## 2025-01-23 ASSESSMENT — LID EXAM ASSESSMENTS
OD_BLEPHARITIS: RLL RUL
OS_BLEPHARITIS: LLL LUL

## 2025-01-23 ASSESSMENT — CONFRONTATIONAL VISUAL FIELD TEST (CVF)
OD_FINDINGS: FULL
OS_FINDINGS: FULL

## 2025-01-23 ASSESSMENT — VISUAL ACUITY
OD_BCVA: 20/50-
OS_BCVA: 20/40

## 2025-02-24 LAB — HBA1C MFR BLD HPLC: 7.9

## 2025-02-25 ENCOUNTER — APPOINTMENT (OUTPATIENT)
Dept: CARDIOLOGY | Facility: CLINIC | Age: 70
End: 2025-02-25

## 2025-03-20 ENCOUNTER — APPOINTMENT (OUTPATIENT)
Dept: CARDIOLOGY | Facility: CLINIC | Age: 70
End: 2025-03-20

## 2025-04-01 ENCOUNTER — APPOINTMENT (OUTPATIENT)
Dept: CARDIOLOGY | Facility: CLINIC | Age: 70
End: 2025-04-01
Payer: MEDICARE

## 2025-04-01 VITALS
BODY MASS INDEX: 27.32 KG/M2 | WEIGHT: 170 LBS | SYSTOLIC BLOOD PRESSURE: 140 MMHG | HEIGHT: 66 IN | OXYGEN SATURATION: 98 % | DIASTOLIC BLOOD PRESSURE: 90 MMHG | HEART RATE: 91 BPM

## 2025-04-01 VITALS — DIASTOLIC BLOOD PRESSURE: 84 MMHG | SYSTOLIC BLOOD PRESSURE: 130 MMHG

## 2025-04-01 DIAGNOSIS — E78.5 HYPERLIPIDEMIA, UNSPECIFIED: ICD-10-CM

## 2025-04-01 DIAGNOSIS — I67.2 CEREBRAL ATHEROSCLEROSIS: ICD-10-CM

## 2025-04-01 DIAGNOSIS — I63.9 CEREBRAL INFARCTION, UNSPECIFIED: ICD-10-CM

## 2025-04-01 DIAGNOSIS — E11.9 TYPE 2 DIABETES MELLITUS W/OUT COMPLICATIONS: ICD-10-CM

## 2025-04-01 DIAGNOSIS — I10 ESSENTIAL (PRIMARY) HYPERTENSION: ICD-10-CM

## 2025-04-01 PROCEDURE — 93000 ELECTROCARDIOGRAM COMPLETE: CPT

## 2025-04-01 PROCEDURE — 99213 OFFICE O/P EST LOW 20 MIN: CPT

## 2025-04-01 RX ORDER — LOSARTAN POTASSIUM 50 MG/1
50 TABLET, FILM COATED ORAL TWICE DAILY
Qty: 180 | Refills: 3 | Status: ACTIVE | COMMUNITY
Start: 1900-01-01 | End: 1900-01-01

## 2025-04-01 RX ORDER — CHLORHEXIDINE GLUCONATE 4 %
325 (65 FE) LIQUID (ML) TOPICAL DAILY
Refills: 0 | Status: ACTIVE | COMMUNITY

## 2025-04-01 RX ORDER — LISINOPRIL 10 MG/1
10 TABLET ORAL DAILY
Refills: 0 | Status: DISCONTINUED | COMMUNITY